# Patient Record
Sex: FEMALE | Race: WHITE
[De-identification: names, ages, dates, MRNs, and addresses within clinical notes are randomized per-mention and may not be internally consistent; named-entity substitution may affect disease eponyms.]

---

## 2018-08-05 ENCOUNTER — HOSPITAL ENCOUNTER (EMERGENCY)
Dept: HOSPITAL 97 - ER | Age: 78
Discharge: HOME | End: 2018-08-05
Payer: COMMERCIAL

## 2018-08-05 DIAGNOSIS — J02.9: Primary | ICD-10-CM

## 2018-08-05 DIAGNOSIS — K12.2: ICD-10-CM

## 2018-08-05 DIAGNOSIS — M06.9: ICD-10-CM

## 2018-08-05 DIAGNOSIS — E03.9: ICD-10-CM

## 2018-08-05 LAB
BUN BLD-MCNC: 10 MG/DL (ref 7–18)
GLUCOSE SERPLBLD-MCNC: 118 MG/DL (ref 74–106)
HCT VFR BLD CALC: 38.8 % (ref 36–45)
LYMPHOCYTES # SPEC AUTO: 3.5 K/UL (ref 0.7–4.9)
MCH RBC QN AUTO: 36.4 PG (ref 27–35)
MCV RBC: 101.7 FL (ref 80–100)
PMV BLD: 7.1 FL (ref 7.6–11.3)
POTASSIUM SERPL-SCNC: 3.8 MMOL/L (ref 3.5–5.1)
RBC # BLD: 3.81 M/UL (ref 3.86–4.86)

## 2018-08-05 PROCEDURE — 93005 ELECTROCARDIOGRAM TRACING: CPT

## 2018-08-05 PROCEDURE — 99284 EMERGENCY DEPT VISIT MOD MDM: CPT

## 2018-08-05 PROCEDURE — 70491 CT SOFT TISSUE NECK W/DYE: CPT

## 2018-08-05 PROCEDURE — 36415 COLL VENOUS BLD VENIPUNCTURE: CPT

## 2018-08-05 PROCEDURE — 85025 COMPLETE CBC W/AUTO DIFF WBC: CPT

## 2018-08-05 PROCEDURE — 71045 X-RAY EXAM CHEST 1 VIEW: CPT

## 2018-08-05 PROCEDURE — 80048 BASIC METABOLIC PNL TOTAL CA: CPT

## 2018-08-05 NOTE — RAD REPORT
EXAM DESCRIPTION:  Russell Single View8/5/2018 12:57 pm

 

CLINICAL HISTORY:  cough

 

COMPARISON:  November 2017

 

FINDINGS:   The lungs appear clear of acute infiltrate. The heart is normal size

 

IMPRESSION:   No acute abnormalities displayed

## 2018-08-05 NOTE — RAD REPORT
EXAM DESCRIPTION:  CT - Soft Tissue Neck W/Contr - 8/5/2018 1:29 pm

 

CLINICAL HISTORY:  Neck pain/sore throat

 

COMPARISON:  None.

 

TECHNIQUE:  Computed axial tomography of the neck was obtained. 50 cc Isovue-300 administered intrave
nously. Coronal and sagittal reconstruction was performed

 

All CT scans are performed using dose optimization technique as appropriate and may include automated
 exposure control or mA/KV adjustment according to patient size.

 

FINDINGS:   The uvula is mildly edematous.

 

The remainder of the pharynx, larynx, tongue bases subglottic trachea is unremarkable.

 

The parotid, submandibular and thyroid glands appear unremarkable.

 

No lymphadenopathy is seen.

 

Fluid is present the left maxillary and ethmoid sinus

 

Spondylosis involves the cervical spine resulting in central spinal and foraminal stenosis

 

IMPRESSION:  Acute left maxillary/ethmoid sinusitis

 

Mild edema involving the uvula

## 2018-08-05 NOTE — EDPHYS
Physician Documentation                                                                           

 [facility]                                                                

Name: Laura Pham                                                                              

Age: 78 yrs                                                                                       

Sex: Female                                                                                       

: 1940                                                                                   

MRN: [id number]                                                                                   

Arrival Date: 2018                                                                          

Time: 11:48                                                                                       

Account#: [id number]                                                                            

Bed 16                                                                                            

Private MD: JOELLE Grant C                                                                            

ED Physician Fabian White                                                                      

HPI:                                                                                              

                                                                                             

12:53 This 78 yrs old  Female presents to ER via Ambulatory with complaints of Neck  jr8 

      Swelling, Cough.                                                                            

12:53 Onset: The symptoms/episode began/occurred gradually, 1 week(s) ago. Associated signs   jr8 

      and symptoms: Pertinent positives: fever, cough. The pain does not radiate. Modifying       

      factors: The symptoms are alleviated by nothing. the symptoms are aggravated by             

      swallowing. Severity of symptoms: At their worst the symptoms were moderate, in the         

      emergency department the symptoms are unchanged. The patient has not experienced            

      similar symptoms in the past. The patient has been recently seen by a physician:.           

      Patient stated that she started with sore throat and cough last week. Was seen by           

      urgent care and PCP. Was given cough medicine and antibiotics. Stated that pain             

      persists and feels worse. Has had on/off fevers .                                           

                                                                                                  

Historical:                                                                                       

- Allergies:                                                                                      

12:13 No Known Allergies;                                                                     tw2 

12:14 NKA;                                                                                    iw  

- Home Meds:                                                                                      

12:14 levothyroxine 75 mcg tab 1 tab once daily [Active]; Protonix 40 mg Oral grps 1 packet   iw  

      once daily [Active]; prednisone 2.5 mg Oral tab once daily [Active]; folic acid 1 mg        

      Oral tab 1 tab once daily [Active]; valsartan oral 12.5 mg oral once daily [Active];        

      methotrexate sodium 2.5 mg Oral tab 2 tabs once wkly [Active]; simvastatin 20 mg Oral       

      tab 1 tab once daily [Active]; hydroxychloroquine 200 mg oral tab 2 times per day           

      [Active]; gabapentin 300 mg oral cap 1 cap 3 times per day [Active]; rituximab              

      intravenous intravenous [Active];                                                           

- PMHx:                                                                                           

12:14 Hypothyroidism; Rheumatoid Arthritis;                                                   iw  

- PSHx:                                                                                           

12:13 breast reduction; Appendectomy; partial hysterectomy; L wrist;                          tw2 

12:14 breast reduction; Appendectomy; partial hysterectomy; L wrist;                          iw  

                                                                                                  

- Immunization history:: Adult Immunizations up to date, Adult Immunizations up to date.          

- Social history:: Smoking status: Patient/guardian denies using tobacco,                         

  Patient/guardian denies using Smoking status: Patient/guardian denies using tobacco.            

- Ebola Screening: : Patient denies travel to an Ebola-affected area in the 21 days               

  before illness onset Patient negative for fever greater than or equal to 101.5                  

  degrees Fahrenheit, and additional compatible Ebola Virus Disease symptoms Patient              

  denies exposure to infectious person Patient denies travel to an Ebola-affected area            

  in the 21 days before illness onset No symptoms or risks identified at this time.               

                                                                                                  

                                                                                                  

ROS:                                                                                              

12:53 Eyes: Negative for injury, pain, redness, and discharge, Cardiovascular: Negative for   jr8 

      chest pain, palpitations, and edema, Abdomen/GI: Negative for abdominal pain, nausea,       

      vomiting, diarrhea, and constipation, Back: Negative for injury and pain, MS/Extremity:     

      Negative for injury and deformity, Skin: Negative for injury, rash, and discoloration,      

      Neuro: Negative for headache, weakness, numbness, tingling, and seizure.                    

12:53 ENT: Positive for sore throat, Negative for drainage from ear(s), ear pain, nasal           

      discharge, rhinorrhea, sinus congestion, difficulty swallowing, difficulty handling         

      secretions.                                                                                 

12:53 Neck: Positive for pain at rest, Negative for pain with movement, stiffness, swelling,      

      tenderness.                                                                                 

12:53 Respiratory: Positive for cough, Negative for dyspnea on exertion, shortness of breath,     

      sputum production, wheezing.                                                                

                                                                                                  

Exam:                                                                                             

12:53 Eyes:  Pupils equal round and reactive to light, extra-ocular motions intact.  Lids and jr8 

      lashes normal.  Conjunctiva and sclera are non-icteric and not injected.  Cornea within     

      normal limits.  Periorbital areas with no swelling, redness, or edema. ENT:  Nares          

      patent. No nasal discharge, no septal abnormalities noted.  Tympanic membranes are          

      normal and external auditory canals are clear.  Oropharynx with no redness, swelling,       

      or masses, exudates, or evidence of obstruction, uvula midline.  Mucous membranes           

      moist. Neck:  Trachea midline, no thyromegaly or masses palpated, and no cervical           

      lymphadenopathy.  Supple, full range of motion without nuchal rigidity, or vertebral        

      point tenderness.  No Meningismus. Cardiovascular:  Regular rate and rhythm with a          

      normal S1 and S2.  No gallops, murmurs, or rubs.  Normal PMI, no JVD.  No pulse             

      deficits. Respiratory:  Lungs have equal breath sounds bilaterally, clear to                

      auscultation and percussion.  No rales, rhonchi or wheezes noted.  No increased work of     

      breathing, no retractions or nasal flaring. Abdomen/GI:  Soft, non-tender, with normal      

      bowel sounds.  No distension or tympany.  No guarding or rebound.  No evidence of           

      tenderness throughout. Back:  No spinal tenderness.  No costovertebral tenderness.          

      Full range of motion. Skin:  Warm, dry with normal turgor.  Normal color with no            

      rashes, no lesions, and no evidence of cellulitis. MS/ Extremity:  Pulses equal, no         

      cyanosis.  Neurovascular intact.  Full, normal range of motion. Neuro:  Awake and           

      alert, GCS 15, oriented to person, place, time, and situation.  Cranial nerves II-XII       

      grossly intact.  Motor strength 5/5 in all extremities.  Sensory grossly intact.            

      Cerebellar exam normal.  Normal gait.                                                       

                                                                                                  

Vital Signs:                                                                                      

12:02  / 115; Pulse 96; Resp 16 S; Temp 98.7(O); Pulse Ox 97% on R/A; Weight 68.04 kg;  iw  

      Height 5 ft. 0 in. (152.40 cm); Pain 8/10;                                                  

12:18  / 65;                                                                            iw  

12:44  / 75; Pulse 78; Resp 17; Pulse Ox 95% on R/A;                                    tw2 

13:53  / 80; Pulse 88; Resp 17; Pulse Ox 95% on R/A;                                    tw2 

12:02 Body Mass Index 29.29 (68.04 kg, 152.40 cm)                                             iw  

                                                                                                  

MDM:                                                                                              

11:55 Patient medically screened.                                                             [facility] 

13:54 Data reviewed: vital signs, nurses notes, and as a result, I will discharge patient.    [facility] 

      Data interpreted: Pulse oximetry: on room air is 95 %. Interpretation: normal.              

      Counseling: I had a detailed discussion with the patient and/or guardian regarding: the     

      historical points, exam findings, and any diagnostic results supporting the                 

      discharge/admit diagnosis, lab results, radiology results, the need for outpatient          

      follow up, a family practitioner, to return to the emergency department if symptoms         

      worsen or persist or if there are any questions or concerns that arise at home.             

                                                                                                  

                                                                                             

12:29 Order name: CBC with Diff; Complete Time: 12:57                                         [facility] 

                                                                                             

12:29 Order name: Basic Metabolic Panel; Complete Time: 13:09                                 [facility] 

                                                                                             

12:11 Order name: XRAY Chest (1 view); Complete Time: 13:35                                   8 

                                                                                             

12:11 Order name: EKG - Nurse/Tech; Complete Time: 12:34                                      8 

                                                                                             

12:29 Order name: IV; Complete Time: 12:47                                                    8 

                                                                                             

12:29 Order name: CT Soft Tissue Neck W/contr; Complete Time: 13:54                           jr8 

                                                                                                  

Administered Medications:                                                                         

No medications were administered                                                                  

                                                                                                  

                                                                                                  

Disposition:                                                                                      

18 13:56 Discharged to Home. Impression: Acute pharyngitis, Uvulitis.                       

- Condition is Stable.                                                                            

- Discharge Instructions: Pharyngitis, Uvulitis.                                                  

- Prescriptions for Zithromax Z- Glenn 250 mg Oral Tablet - take 1 tablet by ORAL route             

  as directed for 5 days Day 1 - take two (2) tablets one time. Day 2, 3, 4 , 5 take              

  one (1) tablet once daily.; 6 tablet.                                                           

- Medication Reconciliation Form, Thank You Letter, Antibiotic Education, Prescription            

  Opioid Use form.                                                                                

- Follow up: JOELLE Grant MD; When: 5 - 6 days; Reason: Recheck today's complaints,                   

  Continuance of care, Re-evaluation by your physician.                                           

- Problem is new.                                                                                 

- Symptoms have improved.                                                                         

                                                                                                  

                                                                                                  

                                                                                                  

Addendum:                                                                                         

2018                                                                                        

     14:19 Co-signature as Attending Physician, Fabian White MD I agree with the assessment and  c
ha

           plan of care.                                                                          

                                                                                                  

Signatures:                                                                                       

Dispatcher [employer]                           [facility]                                                 

Fabian White MD MD cha Williams, Irene, RN                     ARNOL                                                      

Cameron Gilbert PA                        PA   jr8                                                  

Ernestina Suarez RN                          RN   tw2                                                  

                                                                                                  

Corrections: (The following items were deleted from the chart)                                    

                                                                                             

14:03 13:56 2018 13:56 Discharged to Home. Impression: Acute pharyngitis; Uvulitis.     tw2 

      Condition is Stable. Forms are Medication Reconciliation Form, Thank You Letter,            

      Antibiotic Education, Prescription Opioid Use. Follow up: JOELLE Grant; When: 5 - 6 days;         

      Reason: Recheck today's complaints, Continuance of care, Re-evaluation by your              

      physician. Problem is new. Symptoms have improved. jr8                                      

                                                                                                  

**************************************************************************************************

## 2018-08-05 NOTE — ER
Nurse's Notes                                                                                     

 [facility]                                                                

Name: Laura Pham                                                                              

Age: 78 yrs                                                                                       

Sex: Female                                                                                       

: 1940                                                                                   

MRN: [id number]                                                                                   

Arrival Date: 2018                                                                          

Time: 11:48                                                                                       

Account#: [id number]                                                                            

Bed 16                                                                                            

Private MD: JOELLE Grant C                                                                            

Diagnosis: Acute pharyngitis;Uvulitis                                                             

                                                                                                  

Presentation:                                                                                     

                                                                                             

11:58 Presenting complaint: Patient states: has had sore throat , productive cough, was seen  iw  

      by Dr. Grant last week, put on amoxicillin for throat infection, not getting better,         

      running low grade fever this morning, mild SOB, feels achy between her shoulder blades.     

      Transition of care: patient was not received from another setting of care. Onset of         

      symptoms was 2018. Risk Assessment: Do you want to hurt yourself or someone        

      else? Patient reports no desire to harm self or others. Initial Sepsis Screen: Does the     

      patient meet any 2 criteria? No. Patient's initial sepsis screen is negative. Does the      

      patient have a suspected source of infection? No. Patient's initial sepsis screen is        

      negative. Care prior to arrival: None.                                                      

11:58 Method Of Arrival: Ambulatory                                                           iw  

11:58 Acuity: KATHARINE 3                                                                           iw  

                                                                                                  

Historical:                                                                                       

- Allergies:                                                                                      

12:13 No Known Allergies;                                                                     tw2 

12:14 NKA;                                                                                    iw  

- Home Meds:                                                                                      

12:14 levothyroxine 75 mcg tab 1 tab once daily [Active]; Protonix 40 mg Oral grps 1 packet   iw  

      once daily [Active]; prednisone 2.5 mg Oral tab once daily [Active]; folic acid 1 mg        

      Oral tab 1 tab once daily [Active]; valsartan oral 12.5 mg oral once daily [Active];        

      methotrexate sodium 2.5 mg Oral tab 2 tabs once wkly [Active]; simvastatin 20 mg Oral       

      tab 1 tab once daily [Active]; hydroxychloroquine 200 mg oral tab 2 times per day           

      [Active]; gabapentin 300 mg oral cap 1 cap 3 times per day [Active]; rituximab              

      intravenous intravenous [Active];                                                           

- PMHx:                                                                                           

12:14 Hypothyroidism; Rheumatoid Arthritis;                                                   iw  

- PSHx:                                                                                           

12:13 breast reduction; Appendectomy; partial hysterectomy; L wrist;                          tw2 

12:14 breast reduction; Appendectomy; partial hysterectomy; L wrist;                          iw  

                                                                                                  

- Immunization history:: Adult Immunizations up to date, Adult Immunizations up to date.          

- Social history:: Smoking status: Patient/guardian denies using tobacco,                         

  Patient/guardian denies using Smoking status: Patient/guardian denies using tobacco.            

- Ebola Screening: : Patient denies travel to an Ebola-affected area in the 21 days               

  before illness onset Patient negative for fever greater than or equal to 101.5                  

  degrees Fahrenheit, and additional compatible Ebola Virus Disease symptoms Patient              

  denies exposure to infectious person Patient denies travel to an Ebola-affected area            

  in the 21 days before illness onset No symptoms or risks identified at this time.               

                                                                                                  

                                                                                                  

Screenin:03 Abuse screen: Denies threats or abuse. Nutritional screening: No deficits noted.        tw2 

      Tuberculosis screening: No symptoms or risk factors identified. Fall Risk None              

      identified.                                                                                 

                                                                                                  

Assessment:                                                                                       

11:55 General: Appears in no apparent distress. well groomed, Behavior is calm, cooperative,  tw2 

      appropriate for age. Pain: Denies pain. Neuro: Level of Consciousness is awake, alert,      

      obeys commands, Oriented to person, place, time, situation. Cardiovascular: Denies          

      chest pain, shortness of breath, Heart tones S1 S2 Patient's skin is warm and dry.          

      Respiratory: Airway is patent Respiratory effort is even, unlabored, Respiratory            

      pattern is regular, symmetrical, Breath sounds are clear bilaterally. GI: No signs          

      and/or symptoms were reported involving the gastrointestinal system. Abdomen is round       

      non-distended, obese, Bowel sounds present X 4 quads. : No signs and/or symptoms were     

      reported regarding the genitourinary system. EENT: No signs and/or symptoms were            

      reported regarding the EENT system. Derm: No signs and/or symptoms reported regarding       

      the dermatologic system. Skin is intact, is healthy with good turgor, Skin is dry.          

      Musculoskeletal: Range of motion: intact in all extremities, pt has RA, noted               

      deformities in hand joints.                                                                 

12:44 Reassessment: Patient appears in no apparent distress at this time. No changes from     tw2 

      previously documented assessment. Patient and/or family updated on plan of care and         

      expected duration. Pain level reassessed. Patient is alert, oriented x 3, equal             

      unlabored respirations, skin warm/dry/pink.                                                 

13:52 Reassessment: Patient appears in no apparent distress at this time. No changes from     tw2 

      previously documented assessment. Patient and/or family updated on plan of care and         

      expected duration. Pain level reassessed. Patient is alert, oriented x 3, equal             

      unlabored respirations, skin warm/dry/pink.                                                 

                                                                                                  

Vital Signs:                                                                                      

12:02  / 115; Pulse 96; Resp 16 S; Temp 98.7(O); Pulse Ox 97% on R/A; Weight 68.04 kg;  iw  

      Height 5 ft. 0 in. (152.40 cm); Pain 8/10;                                                  

12:18  / 65;                                                                            iw  

12:44  / 75; Pulse 78; Resp 17; Pulse Ox 95% on R/A;                                    tw2 

13:53  / 80; Pulse 88; Resp 17; Pulse Ox 95% on R/A;                                    tw2 

12:02 Body Mass Index 29.29 (68.04 kg, 152.40 cm)                                             iw  

                                                                                                  

ED Course:                                                                                        

11:48 Patient arrived in ED.                                                                  mr  

11:48 JOELLE Grant MD is Private Physician.                                                       mr  

11:55 [facility]                                                               jr8 

11:55 Fabian White MD is Attending Physician.                                             jr8 

12:02 Triage completed.                                                                       iw  

12:02 Arm band placed on.                                                                     iw  

12:03 Ernestina Suarez, RN is Primary Nurse.                                                        tw2 

12:03 Bed in low position. Call light in reach. Pulse ox on. NIBP on.                         tw2 

12:37 Radiology exam delayed due to lab results not completed at this time. (BUN/Creatinine). vm2 

12:47 Basic Metabolic Panel Sent.                                                             ds4 

12:47 CBC with Diff Sent.                                                                     ds4 

12:47 Inserted saline lock: 20 gauge in left antecubital area, using aseptic technique. Blood ds4 

      collected.                                                                                  

12:54 Radiology exam delayed due to lab results not completed at this time. (BUN/Creatinine). vm2 

12:55 XRAY Chest (1 view) In Process Unspecified.                                             EDMS

13:28 CT completed. Patient moved to CT via wheelchair. Patient moved back from CT.           cw1 

13:29 CT Soft Tissue Neck W/contr In Process Unspecified.                                     EDMS

13:55 JOELLE Grant MD is Referral Physician.                                                      jr8 

14:02 No provider procedures requiring assistance completed. IV discontinued, intact,         tw2 

      bleeding controlled, No redness/swelling at site. Pressure dressing applied.                

                                                                                                  

Administered Medications:                                                                         

No medications were administered                                                                  

                                                                                                  

                                                                                                  

Outcome:                                                                                          

13:56 Discharge ordered by MD.                                                                jr8 

14:02 Discharged to home ambulatory.                                                          tw2 

14:02 Condition: stable                                                                           

14:02 Condition: stable                                                                           

14:02 Discharge instructions given to patient, Instructed on discharge instructions, follow       

      up and referral plans. medication usage, Demonstrated understanding of instructions,        

      follow-up care, medications, Prescriptions given X 1.                                       

14:03 Patient left the ED.                                                                    tw2 

                                                                                                  

Signatures:                                                                                       

Dispatcher [employer]                           EDMS                                                 

Marcia Monzon Irene, RN                     RN   Meeta Cho                             cw1                                                  

Cameron Gilbert PA PA jr8                                                  

Fidencio Curtis                             ds4                                                  

Ernestina Suarez RN RN   tw2                                                  

Annette Savage                            2                                                  

                                                                                                  

**************************************************************************************************

## 2018-08-06 NOTE — EKG
Test Date:    2018-08-05               Test Time:    12:28:20

Technician:   SNEHAL                                    

                                                     

MEASUREMENT RESULTS:                                       

Intervals:                                           

Rate:         87                                     

OH:           158                                    

QRSD:         92                                     

QT:           392                                    

QTc:          471                                    

Axis:                                                

P:            33                                     

OH:           158                                    

QRS:          -18                                    

T:            22                                     

                                                     

INTERPRETIVE STATEMENTS:                                       

                                                     

Normal sinus rhythm

Normal ECG

Compared to ECG 10/17/2016 15:04:38

No significant changes



Electronically Signed On 08-06-18 07:34:04 CDT by Quinton Flores

## 2019-09-03 ENCOUNTER — HOSPITAL ENCOUNTER (OUTPATIENT)
Dept: HOSPITAL 97 - OR | Age: 79
Discharge: HOME | End: 2019-09-03
Attending: INTERNAL MEDICINE
Payer: COMMERCIAL

## 2019-09-03 VITALS — DIASTOLIC BLOOD PRESSURE: 65 MMHG | OXYGEN SATURATION: 100 % | SYSTOLIC BLOOD PRESSURE: 120 MMHG | TEMPERATURE: 97 F

## 2019-09-03 DIAGNOSIS — K21.9: ICD-10-CM

## 2019-09-03 DIAGNOSIS — I10: ICD-10-CM

## 2019-09-03 DIAGNOSIS — K22.4: Primary | ICD-10-CM

## 2019-09-03 DIAGNOSIS — E07.9: ICD-10-CM

## 2019-09-03 DIAGNOSIS — Z83.3: ICD-10-CM

## 2019-09-03 DIAGNOSIS — M06.9: ICD-10-CM

## 2019-09-03 DIAGNOSIS — R63.4: ICD-10-CM

## 2019-09-03 DIAGNOSIS — Z82.49: ICD-10-CM

## 2019-09-03 DIAGNOSIS — Z90.49: ICD-10-CM

## 2019-09-03 DIAGNOSIS — R49.0: ICD-10-CM

## 2019-09-03 DIAGNOSIS — K31.7: ICD-10-CM

## 2019-09-03 DIAGNOSIS — K29.50: ICD-10-CM

## 2019-09-03 PROCEDURE — 0DB68ZX EXCISION OF STOMACH, VIA NATURAL OR ARTIFICIAL OPENING ENDOSCOPIC, DIAGNOSTIC: ICD-10-PCS

## 2019-09-03 PROCEDURE — 88305 TISSUE EXAM BY PATHOLOGIST: CPT

## 2019-09-03 PROCEDURE — 88312 SPECIAL STAINS GROUP 1: CPT

## 2019-09-03 PROCEDURE — 0DB58ZX EXCISION OF ESOPHAGUS, VIA NATURAL OR ARTIFICIAL OPENING ENDOSCOPIC, DIAGNOSTIC: ICD-10-PCS

## 2019-09-03 PROCEDURE — 0D758ZZ DILATION OF ESOPHAGUS, VIA NATURAL OR ARTIFICIAL OPENING ENDOSCOPIC: ICD-10-PCS

## 2019-09-03 PROCEDURE — 43239 EGD BIOPSY SINGLE/MULTIPLE: CPT

## 2019-09-03 PROCEDURE — 43249 ESOPH EGD DILATION <30 MM: CPT

## 2019-09-03 NOTE — OP
Surgeon:  Karl Hanson MD



Procedure To Be Performed:  Esophagogastroduodenoscopy.



Indication For Procedure:  Abnormal modified barium swallow, dysphagia, chronic hoarseness.



Plan For Anesthesia:  Monitored anesthesia care.



Complexity:  Average.



Technique:  After obtaining informed consent from the patient explaining risks and complications whic
h include, but are not limited to bleeding, infection, perforation, and anesthesia complication, the 
patient was placed in left lateral position and sedation was given.  From then on, the scope was adva
nced through the mouth and carefully guided up till the second portion of the duodenum.  After the co
mpletion of examination and all therapeutic and diagnostic maneuvers, scope and equipment were withdr
awn and procedure terminated in a safe manner.



Findings:  In the duodenum, no abnormality was seen.  Stomach, mild patchy erythema seen in the body 
and antrum.  Biopsies taken.  Also seen in the stomach with several fundic type polyps from 5-10 mm. 
 Representative biopsies taken.  Esophagus, no characteristics stenosis or narrowing seen in the esop
hagus, but there was evidence of some tortuosity and possibly peristalsis.  Biopsies taken to rule ou
t eosinophilic esophagitis.  To give the patient benefit of the doubt, decision was taken to dilate t
he esophagus, so I used through the scope balloon dilator and dilated the whole esophagus and dilated
 the distal and mid portions up to 15 mm while holding pressure for 30 seconds.  We will see how if t
hat has improved the patient's symptoms.



Complications:  None.



Tolerance To Anesthesia:  Excellent.



Postoperative Diagnosis:  Esophageal dysmotility and tortuosity, gastritis, gastric polyps.



Plan:  

1.Await pathology results.

2.Follow up in the GI clinic in 2 weeks.

3.Continue PPI.





US/MODL

DD:  09/03/2019 10:11:20Voice ID:  [id number]

DT:  09/03/2019 20:50:27Report ID:  [id number]

## 2019-10-29 ENCOUNTER — HOSPITAL ENCOUNTER (OUTPATIENT)
Dept: HOSPITAL 97 - OR | Age: 79
Discharge: HOME | End: 2019-10-29
Attending: INTERNAL MEDICINE
Payer: COMMERCIAL

## 2019-10-29 VITALS — TEMPERATURE: 97.7 F

## 2019-10-29 VITALS — SYSTOLIC BLOOD PRESSURE: 126 MMHG | DIASTOLIC BLOOD PRESSURE: 57 MMHG | OXYGEN SATURATION: 99 %

## 2019-10-29 DIAGNOSIS — K57.30: ICD-10-CM

## 2019-10-29 DIAGNOSIS — K21.9: ICD-10-CM

## 2019-10-29 DIAGNOSIS — Z12.11: Primary | ICD-10-CM

## 2019-10-29 DIAGNOSIS — Z82.49: ICD-10-CM

## 2019-10-29 DIAGNOSIS — K64.8: ICD-10-CM

## 2019-10-29 DIAGNOSIS — I10: ICD-10-CM

## 2019-10-29 DIAGNOSIS — K63.5: ICD-10-CM

## 2019-10-29 DIAGNOSIS — Z83.3: ICD-10-CM

## 2019-10-29 DIAGNOSIS — E07.9: ICD-10-CM

## 2019-10-29 DIAGNOSIS — M06.849: ICD-10-CM

## 2019-10-29 PROCEDURE — 45384 COLONOSCOPY W/LESION REMOVAL: CPT

## 2019-10-29 PROCEDURE — 0DBM8ZX EXCISION OF DESCENDING COLON, VIA NATURAL OR ARTIFICIAL OPENING ENDOSCOPIC, DIAGNOSTIC: ICD-10-PCS

## 2019-10-29 PROCEDURE — 88305 TISSUE EXAM BY PATHOLOGIST: CPT

## 2019-10-29 PROCEDURE — 0DBL8ZX EXCISION OF TRANSVERSE COLON, VIA NATURAL OR ARTIFICIAL OPENING ENDOSCOPIC, DIAGNOSTIC: ICD-10-PCS

## 2019-10-29 NOTE — OP
Surgeon:  Karl Hanson MD



Procedure To Be Performed:  Colonoscopy.



Performing Physician:  Karl Hanson MD.



Indication For Procedures:  Screening.  Also remote history of polyps.



Plan For Anesthesia:  Monitored anesthesia care.



Complexity:  Average.



Technique:  After obtaining informed consent from the patient and explaining risks and complications 
which include but are not limited to bleeding, infection, perforation and anesthesia complications, p
atient was placed in the left lateral position.  Sedation was given.  From then on, the digital recta
l exam was performed and then scope inserted into the rectum and carefully guided up till the cecum. 
 The cecum was identified by the ileocecal valve and appendiceal orifice.  Gradually, scope withdrawn
 while carefully examining the mucosa.  Quality of prep according to [place] prep score was 2 + 2 + 2 
= 6/9.  Scope withdrawal time was 14 minutes.



Findings:  Digital rectal exam did not reveal any significant abnormality.  Multiple several small an
d medium-sized diverticula were seen dispersed in the entire colon.  4 mm polyp seen in the descendin
g colon, removed with hot biopsy.  In the proximal transverse colon, a flat 7 mm polyp was seen.  Thi
s was removed with hot biopsy polypectomy.  Retroflexion revealed grade 1 internal hemorrhoids.



Complications:  None.



Tolerance To Anesthesia:  Excellent.



Postop Diagnosis:  Diverticulosis, polyp.



Plan:  

1.Await pathology results.

2.Follow up in the GI clinic in 2 weeks.

3.Repeat colonoscopy 3-5 years based on pathology.

4.High-fiber diet.





US/MODL

DD:  10/29/2019 08:42:24Voice ID:  [id number]

DT:  10/29/2019 19:55:47Report ID:  [id number]

## 2022-04-22 ENCOUNTER — HOSPITAL ENCOUNTER (OUTPATIENT)
Dept: HOSPITAL 97 - DS | Age: 82
Discharge: HOME | End: 2022-04-22
Attending: INTERNAL MEDICINE
Payer: COMMERCIAL

## 2022-04-22 VITALS — TEMPERATURE: 97.3 F | SYSTOLIC BLOOD PRESSURE: 122 MMHG | DIASTOLIC BLOOD PRESSURE: 63 MMHG

## 2022-04-22 VITALS — BODY MASS INDEX: 26.4 KG/M2

## 2022-04-22 VITALS — OXYGEN SATURATION: 100 %

## 2022-04-22 DIAGNOSIS — M81.0: Primary | ICD-10-CM

## 2022-04-22 LAB
ALBUMIN SERPL BCP-MCNC: 3.6 G/DL (ref 3.4–5)
ALP SERPL-CCNC: 61 U/L (ref 45–117)
ALT SERPL W P-5'-P-CCNC: 25 U/L (ref 12–78)
AST SERPL W P-5'-P-CCNC: 21 U/L (ref 15–37)
BUN BLD-MCNC: 20 MG/DL (ref 7–18)
GLUCOSE SERPLBLD-MCNC: 121 MG/DL (ref 74–106)
POTASSIUM SERPL-SCNC: 3.7 MMOL/L (ref 3.5–5.1)

## 2022-04-22 PROCEDURE — 80053 COMPREHEN METABOLIC PANEL: CPT

## 2022-04-22 PROCEDURE — 96365 THER/PROPH/DIAG IV INF INIT: CPT

## 2022-04-22 PROCEDURE — 36415 COLL VENOUS BLD VENIPUNCTURE: CPT
